# Patient Record
(demographics unavailable — no encounter records)

---

## 2024-10-07 NOTE — REVIEW OF SYSTEMS
[Fatigue] : fatigue [Palpitations] : palpitations [Joint Pain] : joint pain [Myalgia] : myalgia  [All other systems negative] : All other systems negative [Recent Weight Gain (___ Lbs)] : recent weight gain: [unfilled] lbs [Recent Weight Loss (___ Lbs)] : no recent weight loss [FreeTextEntry4] : anterior neck discomfort

## 2024-10-07 NOTE — HISTORY OF PRESENT ILLNESS
[FreeTextEntry1] : Patient is a 56-year-old woman with PMH of Hashimoto's disease here for follow up visit. Patient was diagnosed with hypothyroidism in 2002 which was found incidentally on blood work.   Takes Levothyroxine 112 mcg every morning 6 days per week on an empty stomach. Waits ~ 30 minutes before eating. Reports 30 pound weight gain since 2018. Reports dry skin. Some fatigue. No heat or cold intolerance. Reports chronic muscles aches and joint pain with intermittent fatigue. No neck pain, dysphagia, or dysphonia. No chest pain or SOB. No abdominal pain, nausea, vomiting, diarrhea, constipation. No hair changes.   Thyroid ultrasound in November 2018 showed small heterogenous gland without discrete nodules.   No family history of thyroid disease. Reports family history of Lupus. Patient endorses intermittent fatigue and chronic muscle aches with occasional joint pains - believes she was worked up for autoimmune disease with blood work in the past, reportedly normal.   No exercise. Does walk the dog.   Does report increase in carb intake but tries to watch portions. Does like sweets. She did meet with a nutritionist.   UTD with colonoscopy in 2023.   In regard to vitamin D def, takes vitamin D3 ? 1,000 to 2,000 IU daily but may miss.

## 2024-10-07 NOTE — DATA REVIEWED
[FreeTextEntry1] : 8/16/19\par  CBC normal\par  BUN/cr 8/0.7\par  glucose 95\par  chol 193 tri 259 HDL 41 \par  TSH 1.23 Free T4 1.4\par  Hba1c 5.3%\par  25 vitamin D 25\par  \par  \par  8/10/18\par  TSH 3.6 Free T4 1.0\par  25 vitamin D 27\par  chol 124 tri 117 HDL 48 \par  Hba1c 5.3%\par  \par  August 2017\par  Total Cholesterol 197\par  Triglycerides 120\par  HDL 47\par  \par  \par  TSH 0.71\par  Free T4 1.0\par  HbA1c 5.4 \par  \par  Creatinine 0.7\par  \par  September 2016\par  TSH 0.72\par  Free T4 1.3\par  Vitamin D 25 2.3

## 2024-10-07 NOTE — ASSESSMENT
[FreeTextEntry1] : 55 y/o female with Hashimoto's disease, HTN, vitamin D insufficiency and obesity  1. Hashimoto's Disease/ Hypothyroidism - patient appears euthyroid - will check TFTs and adjust Levothyroxine if needed - will check thyroid ultrasound to evaluate neck symptoms  2. Vitamin D insufficiency  - will check 25 vitamin D level with her PCP - encouraged taking vitamin D3 2000 IU daily  3. HTN - BP is at goal - will continue Metoprolol ER and Losartan/HCTZ  4. Obesity - encouraged a carbohydrate consistent diet with portion control - encouraged exercise - we discussed GLP-1 Kory and reviewed risks and benefits, and she will consider - will refer to medical weight management program - will check CMP, lipids and Hba1c with her PCP  Return visit in 1 year if stable.